# Patient Record
Sex: MALE | Race: OTHER | HISPANIC OR LATINO | Employment: PART TIME | URBAN - METROPOLITAN AREA
[De-identification: names, ages, dates, MRNs, and addresses within clinical notes are randomized per-mention and may not be internally consistent; named-entity substitution may affect disease eponyms.]

---

## 2022-04-04 ENCOUNTER — HOSPITAL ENCOUNTER (EMERGENCY)
Facility: HOSPITAL | Age: 42
Discharge: HOME | End: 2022-04-04
Attending: EMERGENCY MEDICINE
Payer: COMMERCIAL

## 2022-04-04 ENCOUNTER — APPOINTMENT (EMERGENCY)
Dept: RADIOLOGY | Facility: HOSPITAL | Age: 42
End: 2022-04-04
Payer: COMMERCIAL

## 2022-04-04 VITALS
HEART RATE: 70 BPM | OXYGEN SATURATION: 99 % | SYSTOLIC BLOOD PRESSURE: 141 MMHG | DIASTOLIC BLOOD PRESSURE: 75 MMHG | TEMPERATURE: 98.2 F | WEIGHT: 167.11 LBS | RESPIRATION RATE: 16 BRPM

## 2022-04-04 DIAGNOSIS — M54.50 LOW BACK PAIN: Primary | ICD-10-CM

## 2022-04-04 PROCEDURE — 96372 THER/PROPH/DIAG INJ SC/IM: CPT

## 2022-04-04 PROCEDURE — 72100 X-RAY EXAM L-S SPINE 2/3 VWS: CPT

## 2022-04-04 PROCEDURE — 99283 EMERGENCY DEPT VISIT LOW MDM: CPT

## 2022-04-04 PROCEDURE — 99284 EMERGENCY DEPT VISIT MOD MDM: CPT | Performed by: PHYSICIAN ASSISTANT

## 2022-04-04 RX ORDER — KETOROLAC TROMETHAMINE 30 MG/ML
15 INJECTION, SOLUTION INTRAMUSCULAR; INTRAVENOUS ONCE
Status: COMPLETED | OUTPATIENT
Start: 2022-04-04 | End: 2022-04-04

## 2022-04-04 RX ORDER — IBUPROFEN 800 MG/1
800 TABLET ORAL EVERY 8 HOURS PRN
Qty: 21 TABLET | Refills: 0 | Status: SHIPPED | OUTPATIENT
Start: 2022-04-04

## 2022-04-04 RX ORDER — METHOCARBAMOL 500 MG/1
500 TABLET, FILM COATED ORAL EVERY 12 HOURS PRN
Qty: 14 TABLET | Refills: 0 | Status: SHIPPED | OUTPATIENT
Start: 2022-04-04

## 2022-04-04 RX ORDER — LIDOCAINE 50 MG/G
2 PATCH TOPICAL ONCE
Status: DISCONTINUED | OUTPATIENT
Start: 2022-04-04 | End: 2022-04-04 | Stop reason: HOSPADM

## 2022-04-04 RX ADMIN — KETOROLAC TROMETHAMINE 15 MG: 30 INJECTION, SOLUTION INTRAMUSCULAR at 13:23

## 2022-04-04 RX ADMIN — LIDOCAINE 2 PATCH: 50 PATCH CUTANEOUS at 13:23

## 2022-04-04 NOTE — ED PROVIDER NOTES
History  Chief Complaint   Patient presents with    Back Pain     pt c/o lower back pain that started x2 hrs ago  pt denies any knoledge of injury  pt has no other concerns     Patient is a 34 y/o male, Polish speaking, no pmhx presenting to the ED for evaluation of back pain  Pt states he began with onset of lower back yesterday, became worse this morning around 10am, making breakfast  Localizes pain to bilateral lumbar spine, denies radiation of pain  Pt is a  for work  Pt has had similar pain in the past, but was not as strong  Pt has been wearing a back brace for pain  Pt has tried tylenol without relief in pain, last dose was yesterday  Pt denies fevers, IVDA, bladder/bowel incontinence, urinary retention, perianal numbness   used: Yes ([id number])        None       History reviewed  No pertinent past medical history  Past Surgical History:   Procedure Laterality Date    NO PAST SURGERIES         History reviewed  No pertinent family history  I have reviewed and agree with the history as documented  E-Cigarette/Vaping    E-Cigarette Use Never User      E-Cigarette/Vaping Substances     Social History     Tobacco Use    Smoking status: Never Smoker    Smokeless tobacco: Never Used   Vaping Use    Vaping Use: Never used   Substance Use Topics    Alcohol use: Never    Drug use: Never       Review of Systems   All other systems reviewed and are negative  Physical Exam  Physical Exam  Constitutional:       Appearance: Normal appearance  HENT:      Head: Normocephalic and atraumatic  Right Ear: External ear normal       Left Ear: External ear normal       Nose: Nose normal       Mouth/Throat:      Lips: Pink  Mouth: Mucous membranes are moist    Eyes:      Extraocular Movements: Extraocular movements intact  Conjunctiva/sclera: Conjunctivae normal    Pulmonary:      Effort: No tachypnea or respiratory distress     Musculoskeletal:      Cervical back: Normal, normal range of motion and neck supple  Thoracic back: Normal       Lumbar back: Tenderness and bony tenderness present  Decreased range of motion (2/2 pain)  Skin:     General: Skin is warm  Capillary Refill: Capillary refill takes less than 2 seconds  Neurological:      Mental Status: He is alert and oriented to person, place, and time  GCS: GCS eye subscore is 4  GCS verbal subscore is 5  GCS motor subscore is 6  Sensory: Sensation is intact  Motor: Motor function is intact  Gait: Gait is intact  Deep Tendon Reflexes: Reflexes are normal and symmetric  Psychiatric:         Mood and Affect: Mood and affect normal          Speech: Speech normal          Vital Signs  ED Triage Vitals [04/04/22 1224]   Temperature Pulse Respirations Blood Pressure SpO2   98 2 °F (36 8 °C) 70 16 141/75 99 %      Temp Source Heart Rate Source Patient Position - Orthostatic VS BP Location FiO2 (%)   Oral Monitor Sitting Right arm --      Pain Score       10 - Worst Possible Pain           Vitals:    04/04/22 1224   BP: 141/75   Pulse: 70   Patient Position - Orthostatic VS: Sitting         Visual Acuity      ED Medications  Medications   ketorolac (TORADOL) injection 15 mg (15 mg Intramuscular Given 4/4/22 1323)       Diagnostic Studies  Results Reviewed     None                 XR lumbar spine 2 or 3 views   ED Interpretation by Cosme Frank PA-C (04/04 1344)   Degenerative changes - no acute osseous abnormality seen by me      Final Result by Mikhail García MD (04/04 9376)      No acute osseous abnormality  Workstation performed: [id number]                    Procedures  Procedures         ED Course                                             MDM  Number of Diagnoses or Management Options  Diagnosis management comments: Patient is a 36 y/o male, Mosotho speaking, no pmhx presenting to the ED for evaluation of back pain       There was no evidence to support genitourinary etiology  No fevers or other evidence to suspect infectious processes, abscess, osteomyelitis etc  The patient's neurological exam is normal with normal motor and sensory  There is no saddle paresthesias reported and no bowel or bladder incontinence or urinary retention  I suspect the pain is mechanical in nature  Clinical suspicion, plan of care and management was discussed with the patient  The patient was instructed to follow up with their health care provider and the comprehensive spine program  The patient was also instructed to return if the pain worsened, changed, or developed weakness or bowel or bladder trouble  The patient agreed with plan  Disposition  Final diagnoses:   Low back pain     Time reflects when diagnosis was documented in both MDM as applicable and the Disposition within this note     Time User Action Codes Description Comment    4/4/2022  1:10 PM Shawna Finley [M54 48] Low back pain       ED Disposition     ED Disposition Condition Date/Time Comment    Discharge Stable Mon Apr 4, 2022  2:16 PM Guillermo Warren discharge to home/self care              Follow-up Information     Follow up With Specialties Details Why Contact Info Additional [address] [facility] Orth[facility] Surgery Go to  If symptoms worsen [address] [facility]  [address] [facility] [address]  [address] [place] [address], [address] [facility], [address], [place], [address] [phone]          Discharge Medication List as of 4/4/2022  2:16 PM      START taking these medications    Details   ibuprofen (MOTRIN) 800 mg tablet Take 1 tablet (800 mg total) by mouth every 8 (eight) hours as needed for moderate pain, Starting Mon 4/4/2022, Normal      methocarbamol (ROBAXIN) 500 mg tablet Take 1 tablet (500 mg total) by mouth every 12 (twelve) hours as needed for muscle spasms, Starting Mon 4/4/2022, Normal                 PDMP Review     None          ED Provider  Electronically Signed by           Aleja Mark PA-C  04/04/22 4277

## 2022-04-07 ENCOUNTER — TELEPHONE (OUTPATIENT)
Dept: PHYSICAL THERAPY | Facility: OTHER | Age: 42
End: 2022-04-07

## 2022-04-07 NOTE — TELEPHONE ENCOUNTER
Message left for patient with assistance of [address] # [phone] regarding referral entered for  [facility]  Contact information, hours of operation and VM instructions left  Nurse requested patient to CB if needed and leave Full Name &  on VM       Referral deferred for f/u

## 2022-04-13 ENCOUNTER — TELEPHONE (OUTPATIENT)
Dept: PHYSICAL THERAPY | Facility: OTHER | Age: 42
End: 2022-04-13

## 2022-04-13 NOTE — TELEPHONE ENCOUNTER
Message left for patient regarding referral entered for  [facility]  Contact information, hours of operation and VM instructions left  Nurse requested patient to CB if needed and leave Full Name &  on VM       Referral closed per protocol    NOTE: Message left with assistance of Nura Contreras # [phone]